# Patient Record
(demographics unavailable — no encounter records)

---

## 2025-05-21 NOTE — PROCEDURE
[FreeTextEntry3] :  Ear cleaning: Cerumen Removal/Ear Cleaning for Otitis Externa Pre-operative Diagnosis: left Cerumen Impaction Post-operative Diagnosis: Same Procedure: Binocular microscopy with cerumen removal- [id number] Procedure Details: The patient was placed in the supine position. The operating microscope was positioned. I then placed the ear speculum in the EAC. Cerumen was then removed using a mixture of otologic curettes, and suction. The TM was noted to be intact. I then performed the procedure of the opposite ear in similar fashion. The patient tolerated procedure well. Findings: Bilateral Ear Canal - normal Bilateral Tympanic Membrane - normal Recommendations: Debrox Complications: None

## 2025-05-21 NOTE — ASSESSMENT
[FreeTextEntry1] : - 5/21/25: 62 y/o M with history of cerumen impaction, last cleaned in 2017 presents with bilateral tinnitus left> right which started 6 weeks ago. On exam there is evidence of left cerumen impaction. This was cleaned today without issue. I am recommending Debrox drops to soften cerumen for 1 week.  Follow up in 2-3 weeks. If symptoms persist consider audio/tymps.  -Debrox drops x 1 week  -Follow up in 2-3 weeks

## 2025-05-21 NOTE — HISTORY OF PRESENT ILLNESS
[de-identified] : 5/21/25: 60 y/o M with history of cerumen impaction, last cleaned in 2017 presents with bilateral tinnitus left> right which started 6 weeks ago. He notes he may have had baseline tinnitus for 6 months prior to this. He reports it sounds like a "teapot," nonpulsatile. He denies hearing loss, otalgia, otorrhea. He uses qtips. He saw internist yesterday and was advised he may have "dried" cerumen in left ear.

## 2025-05-21 NOTE — HISTORY OF PRESENT ILLNESS
[de-identified] : 5/21/25: 62 y/o M with history of cerumen impaction, last cleaned in 2017 presents with bilateral tinnitus left> right which started 6 weeks ago. He notes he may have had baseline tinnitus for 6 months prior to this. He reports it sounds like a "teapot," nonpulsatile. He denies hearing loss, otalgia, otorrhea. He uses qtips. He saw internist yesterday and was advised he may have "dried" cerumen in left ear.

## 2025-05-21 NOTE — PHYSICAL EXAM
[employer] [Normal] : mucosa is normal [Midline] : trachea located in midline position [de-identified] : left dried cerumen impaction adherent to tympanic membrane- cleaned away with suction/curette with no issues.

## 2025-05-21 NOTE — PROCEDURE
[FreeTextEntry3] :  Ear cleaning: Cerumen Removal/Ear Cleaning for Otitis Externa Pre-operative Diagnosis: left Cerumen Impaction Post-operative Diagnosis: Same Procedure: Binocular microscopy with cerumen removal- [id number] Procedure Details: The patient was placed in the supine position. The operating microscope was positioned. I then placed the ear speculum in the EAC. Cerumen was then removed using a mixture of otologic curettes, and suction. The TM was noted to be intact. I then performed the procedure of the opposite ear in similar fashion. The patient tolerated procedure well. Findings: Bilateral Ear Canal - normal Bilateral Tympanic Membrane - normal Recommendations: Debrox Complications: None

## 2025-05-21 NOTE — ASSESSMENT
[FreeTextEntry1] : - 5/21/25: 60 y/o M with history of cerumen impaction, last cleaned in 2017 presents with bilateral tinnitus left> right which started 6 weeks ago. On exam there is evidence of left cerumen impaction. This was cleaned today without issue. I am recommending Debrox drops to soften cerumen for 1 week.  Follow up in 2-3 weeks. If symptoms persist consider audio/tymps.  -Debrox drops x 1 week  -Follow up in 2-3 weeks

## 2025-05-21 NOTE — PHYSICAL EXAM
[Normal] : mucosa is normal [Midline] : trachea located in midline position [de-identified] : left dried cerumen impaction adherent to tympanic membrane- cleaned away with suction/curette with no issues.